# Patient Record
Sex: MALE | Race: WHITE
[De-identification: names, ages, dates, MRNs, and addresses within clinical notes are randomized per-mention and may not be internally consistent; named-entity substitution may affect disease eponyms.]

---

## 2020-12-08 ENCOUNTER — HOSPITAL ENCOUNTER (EMERGENCY)
Dept: HOSPITAL 54 - ER | Age: 65
Discharge: HOME | End: 2020-12-08
Payer: COMMERCIAL

## 2020-12-08 VITALS — HEIGHT: 72 IN | WEIGHT: 165 LBS | BODY MASS INDEX: 22.35 KG/M2

## 2020-12-08 VITALS — SYSTOLIC BLOOD PRESSURE: 132 MMHG | DIASTOLIC BLOOD PRESSURE: 70 MMHG

## 2020-12-08 DIAGNOSIS — N40.0: ICD-10-CM

## 2020-12-08 DIAGNOSIS — F17.200: ICD-10-CM
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
## 2020-12-08 DIAGNOSIS — S71.112A: Primary | ICD-10-CM

## 2020-12-08 DIAGNOSIS — Z98.890: ICD-10-CM
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
## 2020-12-08 PROCEDURE — 99283 EMERGENCY DEPT VISIT LOW MDM: CPT

## 2020-12-08 NOTE — NUR
pt rec'd lac care by md at bed side. wound care provideed by FABIOLA Holbrook . area 
was covered w/ dd. no bleeding noted. medically stable for D/C. Patient 
discharged to home in stable condition. Rx and Written and verbal after care 
instructions given. Patient verbalizes understanding of instruction.